# Patient Record
Sex: FEMALE | Race: WHITE | NOT HISPANIC OR LATINO | Employment: UNEMPLOYED | URBAN - NONMETROPOLITAN AREA
[De-identification: names, ages, dates, MRNs, and addresses within clinical notes are randomized per-mention and may not be internally consistent; named-entity substitution may affect disease eponyms.]

---

## 2020-03-03 ENCOUNTER — OFFICE VISIT (OUTPATIENT)
Dept: ORTHOPEDIC SURGERY | Facility: CLINIC | Age: 65
End: 2020-03-03

## 2020-03-03 VITALS
SYSTOLIC BLOOD PRESSURE: 180 MMHG | TEMPERATURE: 98.2 F | WEIGHT: 176.6 LBS | RESPIRATION RATE: 18 BRPM | HEIGHT: 67 IN | BODY MASS INDEX: 27.72 KG/M2 | HEART RATE: 72 BPM | DIASTOLIC BLOOD PRESSURE: 72 MMHG | OXYGEN SATURATION: 95 %

## 2020-03-03 DIAGNOSIS — M79.641 HAND PAIN, RIGHT: Primary | ICD-10-CM

## 2020-03-03 DIAGNOSIS — M19.041 OSTEOARTHRITIS OF FINGER OF RIGHT HAND: ICD-10-CM

## 2020-03-03 PROCEDURE — 20600 DRAIN/INJ JOINT/BURSA W/O US: CPT | Performed by: ORTHOPAEDIC SURGERY

## 2020-03-03 PROCEDURE — 99203 OFFICE O/P NEW LOW 30 MIN: CPT | Performed by: ORTHOPAEDIC SURGERY

## 2020-03-03 NOTE — PROGRESS NOTES
Roseann Smith is a 64 y.o. female   Primary provider:  Provider, No Known       Chief Complaint   Patient presents with   • Right Hand - Pain, Initial Evaluation       HISTORY OF PRESENT ILLNESS: The first office visit for evaluation of pain in the right index finger.    Mrs. Smith is 64 years old and right-hand dominant.  About a month ago she began having pain in her right index finger.  There is been no trauma and she denies any previous problems with the finger.  Pain is well localized to the index finger at the DIP level.  The pain is worse with use of the hand as with writing and eating.  There is been no specific relieving factor.  She has had no treatment for this.    She is taking no medications and has no allergies.  Her general health is good.  She is .    Pain   This is a new problem. The current episode started 1 to 4 weeks ago. Episode frequency: moderate. The problem has been unchanged. Associated symptoms include joint swelling. Associated symptoms comments: Aching and swelling right index finger. She has tried ice for the symptoms. The treatment provided no relief.        CONCURRENT MEDICAL HISTORY:    History reviewed. No pertinent past medical history.    No Known Allergies    No current outpatient medications on file.    Past Surgical History:   Procedure Laterality Date   • HYSTERECTOMY     • OOPHORECTOMY         History reviewed. No pertinent family history.     Social History     Socioeconomic History   • Marital status:      Spouse name: Not on file   • Number of children: Not on file   • Years of education: Not on file   • Highest education level: Not on file        Review of Systems   Musculoskeletal: Positive for joint swelling.        Right index finger    All other systems reviewed and are negative.    Review of systems is positive as noted above.  PHYSICAL EXAMINATION:       Vitals:    03/03/20 1434   BP: 180/72   BP Location: Right arm   Patient Position: Sitting  "  Cuff Size: Adult   Pulse: 72   Resp: 18   Temp: 98.2 °F (36.8 °C)   TempSrc: Temporal   SpO2: 95%   Weight: 80.1 kg (176 lb 9.6 oz)   Height: 170.2 cm (67\")   PainSc:   8   PainLoc: Hand  Comment: index finger       Physical Exam in general she is alert pleasant and in no apparent distress at rest.  She responds appropriately to questions and commands.  GAIT:     [x]  Normal  []  Antalgic    Assistive device: [x]  None  []  Walker     []  Crutches  []  Cane     []  Wheelchair  []  Stretcher    Ortho Exam directed to the upper extremities.  There is tender fullness over the dorsal ulnar aspect of the right index finger at the level of the DIP joint.  This has a cystic feel.  There is no warmth erythema.  The nail is unremarkable.  Sensory exam is intact to soft touch.  There is no pain on stressing of the collateral ligaments of the DIP joint.  DIP motion of the DIP joint is smooth but painfully restricted from about 5 to 30 degrees.  All tendons are intact.  Radial pulses strong.  She also has arthritic change about the left index finger DIP joint.    Potential benefits of aspiration of the cystic area were discussed with her.  She appeared to understand and wished to proceed.    Right hand was prepped.  Skin was infiltrated with 1% Xylocaine with epinephrine.  The over the dorsal ulnar aspect of the index finger at the DIP level was then aspirated.  I was unable to obtain any fluid.  She tolerated this well.      Xr Hand 3+ View Right    Result Date: 3/3/2020  Narrative: Ordering Provider:  Trevor Ortez MD Ordering Diagnosis/Indication:  Hand pain, right Procedure:  XR HAND 3+ VW RIGHT Exam Date:  3/3/20 COMPARISON: None     Impression:  Radiographs of the right hand PA lateral and oblique views done today show degenerative change at the index finger DIP joint.  There is spurring of the neck of the middle phalanx as well as of the base of the distal phalanx.  There is a cystic appearing lesion towards the " radial condyle of the middle phalanx with surrounding sclerosis.  There is also narrowing of the small finger DIP joint space. Trevor Ortez MD 3/3/20      Small Joint Arthrocentesis: R index DIP  Consent given by: patient  Site marked: site marked  Timeout: Immediately prior to procedure a time out was called to verify the correct patient, procedure, equipment, support staff and site/side marked as required   Supporting Documentation  Indications: pain   Procedure Details  Location: index finger - R index DIP  Preparation: Patient was prepped and draped in the usual sterile fashion  Needle size: 25 G  Approach: lateral  Medication group details: 0.5 licocaine with epi NDC # 8337-0585-90 LOT # [id number].  Aspirate amount: 0 mL  Patient tolerance: patient tolerated the procedure well with no immediate complications        ASSESSMENT: Pain right index finger probably secondary to DIP joint osteoarthritis.    The natural history of the disorder and treatment options were reviewed.  At present I see no surgical indications.  She was given Coban wrap for soft support.  She may advance activities as tolerated.  She was also instructed in use of oral anti-inflammatory medications on a regular basis for the next 10 to 14 days.  If her symptoms are improved at that point she can wean herself from her medication.    If her symptoms do not respond to the above measures she will call for reevaluation.    Diagnoses and all orders for this visit:    Hand pain, right  -     XR Hand 3+ View Right    Osteoarthritis of finger of right hand  -     Small Joint Arthrocentesis: R index DIP          PLAN          Patient's Body mass index is 27.66 kg/m². BMI is above normal parameters. Recommendations include: exercise counseling and nutrition counseling.  Return if symptoms worsen or fail to improve.    Trevor Ortez MD